# Patient Record
Sex: MALE | Race: WHITE | NOT HISPANIC OR LATINO | Employment: OTHER | URBAN - METROPOLITAN AREA
[De-identification: names, ages, dates, MRNs, and addresses within clinical notes are randomized per-mention and may not be internally consistent; named-entity substitution may affect disease eponyms.]

---

## 2018-07-01 ENCOUNTER — HOSPITAL ENCOUNTER (EMERGENCY)
Facility: HOSPITAL | Age: 38
Discharge: HOME | End: 2018-07-02
Attending: EMERGENCY MEDICINE
Payer: COMMERCIAL

## 2018-07-01 VITALS
HEIGHT: 73 IN | BODY MASS INDEX: 23.86 KG/M2 | OXYGEN SATURATION: 99 % | DIASTOLIC BLOOD PRESSURE: 81 MMHG | HEART RATE: 83 BPM | WEIGHT: 180 LBS | TEMPERATURE: 99 F | SYSTOLIC BLOOD PRESSURE: 130 MMHG

## 2018-07-01 DIAGNOSIS — S62.92XB OPEN FRACTURE OF LEFT HAND, INITIAL ENCOUNTER: Primary | ICD-10-CM

## 2018-07-01 PROBLEM — M79.5 FOREIGN BODY (FB) IN SOFT TISSUE: Status: ACTIVE | Noted: 2018-07-01

## 2018-07-01 PROCEDURE — 99284 EMERGENCY DEPT VISIT MOD MDM: CPT | Mod: ,,, | Performed by: EMERGENCY MEDICINE

## 2018-07-01 PROCEDURE — 96372 THER/PROPH/DIAG INJ SC/IM: CPT

## 2018-07-01 PROCEDURE — 99284 EMERGENCY DEPT VISIT MOD MDM: CPT | Mod: 25

## 2018-07-01 PROCEDURE — 25000003 PHARM REV CODE 250: Performed by: EMERGENCY MEDICINE

## 2018-07-01 PROCEDURE — 20520 RMVL FB MUSC/TDN SIMPLE: CPT

## 2018-07-01 RX ORDER — CEFAZOLIN SODIUM 1 G/3ML
1 INJECTION, POWDER, FOR SOLUTION INTRAMUSCULAR; INTRAVENOUS
Status: COMPLETED | OUTPATIENT
Start: 2018-07-01 | End: 2018-07-02

## 2018-07-01 RX ORDER — OXYCODONE AND ACETAMINOPHEN 10; 325 MG/1; MG/1
1 TABLET ORAL EVERY 4 HOURS PRN
COMMUNITY
End: 2018-07-09

## 2018-07-01 RX ORDER — SULFAMETHOXAZOLE AND TRIMETHOPRIM 800; 160 MG/1; MG/1
1 TABLET ORAL 2 TIMES DAILY
Qty: 20 TABLET | Refills: 0 | Status: SHIPPED | OUTPATIENT
Start: 2018-07-01 | End: 2018-07-11

## 2018-07-01 RX ORDER — LIDOCAINE HYDROCHLORIDE 10 MG/ML
20 INJECTION, SOLUTION EPIDURAL; INFILTRATION; INTRACAUDAL; PERINEURAL
Status: COMPLETED | OUTPATIENT
Start: 2018-07-01 | End: 2018-07-01

## 2018-07-01 RX ADMIN — LIDOCAINE HYDROCHLORIDE 200 MG: 10 INJECTION, SOLUTION EPIDURAL; INFILTRATION; INTRACAUDAL; PERINEURAL at 11:07

## 2018-07-02 ENCOUNTER — TELEPHONE (OUTPATIENT)
Dept: ORTHOPEDICS | Facility: CLINIC | Age: 38
End: 2018-07-02

## 2018-07-02 PROCEDURE — 90471 IMMUNIZATION ADMIN: CPT | Performed by: EMERGENCY MEDICINE

## 2018-07-02 PROCEDURE — 63600175 PHARM REV CODE 636 W HCPCS: Performed by: EMERGENCY MEDICINE

## 2018-07-02 PROCEDURE — 90715 TDAP VACCINE 7 YRS/> IM: CPT | Performed by: EMERGENCY MEDICINE

## 2018-07-02 RX ADMIN — CEFAZOLIN 1 G: 330 INJECTION, POWDER, FOR SOLUTION INTRAMUSCULAR; INTRAVENOUS at 12:07

## 2018-07-02 RX ADMIN — CLOSTRIDIUM TETANI TOXOID ANTIGEN (FORMALDEHYDE INACTIVATED), CORYNEBACTERIUM DIPHTHERIAE TOXOID ANTIGEN (FORMALDEHYDE INACTIVATED), BORDETELLA PERTUSSIS TOXOID ANTIGEN (GLUTARALDEHYDE INACTIVATED), BORDETELLA PERTUSSIS FILAMENTOUS HEMAGGLUTININ ANTIGEN (FORMALDEHYDE INACTIVATED), BORDETELLA PERTUSSIS PERTACTIN ANTIGEN, AND BORDETELLA PERTUSSIS FIMBRIAE 2/3 ANTIGEN 0.5 ML: 5; 2; 2.5; 5; 3; 5 INJECTION, SUSPENSION INTRAMUSCULAR at 12:07

## 2018-07-02 NOTE — TELEPHONE ENCOUNTER
Called pt left VM ,appt made to see Nurys on Monday 7/9/18 at 3pm for left hand fx and nail bed removal. ER f/u from yesterday 7/1/18. appt details left in VM.

## 2018-07-02 NOTE — ASSESSMENT & PLAN NOTE
Good Hill is a 37 y.o. male with a nail gun injury to his second and 3rd digit of his left hand sustaining a fracture through the distal aspect of the middle phalanx of second digit.    -digital nerve block was performed in ED after nail was cleaned with betadine  -after adequate analgesia the nail was removed with vice   -wounds were then cleaned with normal saline and betadine  -placed in an ulnar gutter splint  -will have him follow up in hand clinic and he will be given bactrim for antibiotics

## 2018-07-02 NOTE — ED NOTES
Patient identifiers for Good Hill 37 y.o. male checked and correct.  Chief Complaint   Patient presents with    Puncture Wound     pt states he was using nail gun and accidently impaled left pointer finger to index finger with 3 1/2 inch nails x 10 mintues ago. pt complains of pain 4/10 and numbness to both fingers.      History reviewed. No pertinent past medical history.  Allergies reported: Review of patient's allergies indicates:  No Known Allergies      LOC: Patient is awake, alert, and aware of environment with an appropriate affect. Patient is oriented x 3 and speaking appropriately.  APPEARANCE: Patient resting comfortably and in no acute distress. Patient is clean and well groomed, patient's clothing is properly fastened.  SKIN: The skin is warm and dry.   MUSKULOSKELETAL: Patient is moving all extremities well, limited ROM to index and middle finger, nail noted through left index finger into middle finger, no bleeding noted, numbness to those two finger at this time, +pulse.   RESPIRATORY: Airway is open and patent. Respirations are spontaneous and non-labored with normal effort and rate.

## 2018-07-02 NOTE — HPI
Good Hill is a 37 y.o. RHD male who presents to the ED after he accidentally shot a nail through his left hand at his second and third digit while he was doing some wood work.  He felt immediate pain and came straight to the ED.  Tetanus was unknown and was given in the ED.  Ancef was also given. He denies any other injuries and he denies numbness and tingling.

## 2018-07-02 NOTE — TELEPHONE ENCOUNTER
----- Message from Tab Mendoza MD sent at 7/1/2018 11:42 PM CDT -----  Please schedule follow up for this patient in one week with Nurys or Victorino after nail removal in ED with middle phalanx fx to 2nd digit.  Thanks!

## 2018-07-02 NOTE — ED TRIAGE NOTES
Nail through left index and middle finger while using a nail gun. No bleeding noted at this time.

## 2018-07-02 NOTE — SUBJECTIVE & OBJECTIVE
"History reviewed. No pertinent past medical history.    History reviewed. No pertinent surgical history.    Review of patient's allergies indicates:  No Known Allergies    Current Facility-Administered Medications   Medication    ceFAZolin injection 1 g    lidocaine (PF) 10 mg/ml (1%) injection 200 mg    Tdap vaccine injection 0.5 mL     Current Outpatient Prescriptions   Medication Sig    oxyCODONE-acetaminophen (PERCOCET)  mg per tablet Take 1 tablet by mouth every 4 (four) hours as needed for Pain.     Family History     None        Social History Main Topics    Smoking status: Current Some Day Smoker    Smokeless tobacco: Never Used    Alcohol use Yes    Drug use: Yes     Types: Marijuana    Sexual activity: Not on file     ROS     Constitutional: negative for fevers  Eyes: no visual changes  ENT: negative for hearing loss  Respiratory: negative for dyspnea  Cardiovascular: negative for chest pain  Gastrointestinal: negative for abdominal pain  Genitourinary: negative for dysuria  Neurological: negative for headaches  Behavioral/Psych: negative for hallucinations  Endocrine: negative for temperature intolerance    Objective:     Vital Signs (Most Recent):  Temp: 98.7 °F (37.1 °C) (07/01/18 2205)  Pulse: 83 (07/01/18 2205)  BP: 130/81 (07/01/18 2205)  SpO2: 99 % (07/01/18 2205) Vital Signs (24h Range):  Temp:  [98.7 °F (37.1 °C)] 98.7 °F (37.1 °C)  Pulse:  [83] 83  SpO2:  [99 %] 99 %  BP: (130)/(81) 130/81     Weight: 81.6 kg (180 lb)  Height: 6' 1" (185.4 cm)  Body mass index is 23.75 kg/m².    No intake or output data in the 24 hours ending 07/01/18 8684    Ortho/SPM Exam    Gen:  No acute distress  CV:  Peripherally well-perfused.  Pulses 2+ bilaterally.  Lungs:  Normal respiratory effort.  Abdomen:  Soft, non-tender, non-distended  Head/Neck:  Normocephalic.  Atraumatic. No TTP, AROM and PROM intact without pain  Neuro:  CN intact without deficit, SILT throughout B/L Upper & Lower " Extremities  Pelvis: No TTP    MSK:  LUE:  - there is 4cm nail through the medial aspect of the second digit at the level of the middle phalanx through the 3rd digit superficially  - AROM and PROM of shoulder elbow and wrist and MCP is intact  - AIN/PIN/Radial/Median/Ulnar Nerves assessed in isolation without deficit  - SILT throughout  - Radial & Ulnar arteries palpated 2+  - Capillary Refill <3s    Bilateral LE:  - no acute deformity   - AROM and PROM intact.  - TA/EHL/Gastroc/FHL assessed in isolation without deficit  - SILT throughout  - DP and PT palpated  2+  - Capillary Refill <3s        Significant Labs: All pertinent labs within the past 24 hours have been reviewed.    Significant Imaging: I have reviewed and interpreted all pertinent imaging results/findings.   Nail traversing the 2nd and 3rd digit possible fx through middle phalanx of 2nd digit

## 2018-07-02 NOTE — ED PROVIDER NOTES
Encounter Date: 7/1/2018    SCRIBE #1 NOTE: I, Heidi Agustin, am scribing for, and in the presence of, Hien Cabezas MD.       History     Chief Complaint   Patient presents with    Puncture Wound     pt states he was using nail gun and accidently impaled left pointer finger to index finger with 3 1/2 inch nails x 10 mintues ago. pt complains of pain 4/10 and numbness to both fingers.      Mr. Good Hill is a 37 y.o.  male with arthritis of right hand who presents to the ED for evaluation of puncture wounds to left middle and pointer fingers. The patient states that he was working on his house earlier tonight and impaled his fingers on a 3.5 in nail from a nail-gun. He denies any preceding symptoms, including lightheadedness, weakness, or confusion.The nail is in place. Associated symptoms include mechanical disruption of ROM of fingers. He denies any other symptoms at this time, including numbness. He reports managing his pain with half of a percocet at home. He is comfortable at this time. The patient is right-handed and works as a . Last tetanus vaccine was more than 7 years ago.            The history is provided by the patient and medical records.     Review of patient's allergies indicates:  No Known Allergies  History reviewed. No pertinent past medical history.  History reviewed. No pertinent surgical history.  History reviewed. No pertinent family history.  Social History   Substance Use Topics    Smoking status: Current Some Day Smoker    Smokeless tobacco: Never Used    Alcohol use Yes     Review of Systems   Constitutional: Negative for fever.   HENT: Negative for sore throat.    Respiratory: Negative for shortness of breath.    Cardiovascular: Negative for chest pain.   Gastrointestinal: Negative for nausea.   Genitourinary: Negative for dysuria.   Musculoskeletal: Positive for arthralgias and myalgias. Negative for back pain.   Skin: Positive for wound. Negative for rash.    Neurological: Negative for tremors, weakness and numbness.   Hematological: Does not bruise/bleed easily.       Physical Exam     Initial Vitals [07/01/18 2205]   BP Pulse Resp Temp SpO2   130/81 83 -- 98.7 °F (37.1 °C) 99 %      MAP       --         Physical Exam    Nursing note and vitals reviewed.  Constitutional: He appears well-developed and well-nourished. He is not diaphoretic. No distress.   HENT:   Head: Normocephalic and atraumatic.   Mouth/Throat: Oropharynx is clear and moist.   Eyes: Conjunctivae and EOM are normal. Pupils are equal, round, and reactive to light.   Neck: Normal range of motion. Neck supple. No JVD present.   Cardiovascular: Normal rate, regular rhythm and normal heart sounds.   No murmur heard.  Pulmonary/Chest: Breath sounds normal. He has no wheezes. He has no rhonchi. He has no rales. He exhibits no tenderness.   Abdominal: Soft. Bowel sounds are normal. He exhibits no distension and no mass. There is no tenderness. There is no rebound and no guarding.   Musculoskeletal: He exhibits no edema.   LUE: Sensation is intact. Capillary refill is WNL.    Neurological: He is alert and oriented to person, place, and time. He has normal strength. No cranial nerve deficit or sensory deficit.   Skin: Skin is warm and dry. No rash noted.   Psychiatric: He has a normal mood and affect.         ED Course   Procedures  Labs Reviewed - No data to display       Imaging Results    None          Medical Decision Making:   History:   Old Medical Records: I decided to obtain old medical records.  Clinical Tests:   Radiological Study: Ordered and Reviewed  Other:   I have discussed this case with another health care provider.       <> Summary of the Discussion:   2246:  Case discussed with Orthopedics. They are aware of patient's presentation and following imaging.             Scribe Attestation:   Scribe #1: I performed the above scribed service and the documentation accurately describes the services I  performed. I attest to the accuracy of the note.    Attending Attestation:             Attending ED Notes:   Patient is a right-handed  that used a nail gun and has a nail through his index and middle finger on the left.  Distal capillary refill is intact distal sensation is intact.  I spoke with Orthopedics.  They are requesting tetanus and g of Ancef.    Ortho removed nail; ok to dc w bactirm and hand fu; has pain meds at home             Clinical Impression: open fracture left index finger   The encounter diagnosis was Open fracture of left hand, initial encounter.                             Hien Cabezas MD  07/02/18 0051

## 2018-07-02 NOTE — CONSULTS
[facility]  Orthopedics  Consult Note    Patient Name: Good Hill  MRN: [id number]  Admission Date: 7/1/2018  Hospital Length of Stay: 0 days  Attending Provider: Nilam Coy MD  Primary Care Provider: Isauro Almanza MD    Patient information was obtained from patient and ER records.     Inpatient consult to Orthopedic Surgery  Consult performed by: SWATI ULRICH  Consult ordered by: NILAM COY        Subjective:     Principal Problem:Foreign body (FB) in soft tissue    Chief Complaint:   Chief Complaint   Patient presents with    Puncture Wound     pt states he was using nail gun and accidently impaled left pointer finger to index finger with 3 1/2 inch nails x 10 mintues ago. pt complains of pain 4/10 and numbness to both fingers.         HPI: Good Hill is a 37 y.o. RHD male who presents to the ED after he accidentally shot a nail through his left hand at his second and third digit while he was doing some wood work.  He felt immediate pain and came straight to the ED.  Tetanus was unknown and was given in the ED.  Ancef was also given. He denies any other injuries and he denies numbness and tingling.    History reviewed. No pertinent past medical history.    History reviewed. No pertinent surgical history.    Review of patient's allergies indicates:  No Known Allergies    Current Facility-Administered Medications   Medication    ceFAZolin injection 1 g    lidocaine (PF) 10 mg/ml (1%) injection 200 mg    Tdap vaccine injection 0.5 mL     Current Outpatient Prescriptions   Medication Sig    oxyCODONE-acetaminophen (PERCOCET)  mg per tablet Take 1 tablet by mouth every 4 (four) hours as needed for Pain.     Family History     None        Social History Main Topics    Smoking status: Current Some Day Smoker    Smokeless tobacco: Never Used    Alcohol use Yes    Drug use: Yes     Types: Marijuana    Sexual activity: Not on file     ROS     Constitutional: negative  "for fevers  Eyes: no visual changes  ENT: negative for hearing loss  Respiratory: negative for dyspnea  Cardiovascular: negative for chest pain  Gastrointestinal: negative for abdominal pain  Genitourinary: negative for dysuria  Neurological: negative for headaches  Behavioral/Psych: negative for hallucinations  Endocrine: negative for temperature intolerance    Objective:     Vital Signs (Most Recent):  Temp: 98.7 °F (37.1 °C) (07/01/18 2205)  Pulse: 83 (07/01/18 2205)  BP: 130/81 (07/01/18 2205)  SpO2: 99 % (07/01/18 2205) Vital Signs (24h Range):  Temp:  [98.7 °F (37.1 °C)] 98.7 °F (37.1 °C)  Pulse:  [83] 83  SpO2:  [99 %] 99 %  BP: (130)/(81) 130/81     Weight: 81.6 kg (180 lb)  Height: 6' 1" (185.4 cm)  Body mass index is 23.75 kg/m².    No intake or output data in the 24 hours ending 07/01/18 2334    Ortho/SPM Exam    Gen:  No acute distress  CV:  Peripherally well-perfused.  Pulses 2+ bilaterally.  Lungs:  Normal respiratory effort.  Abdomen:  Soft, non-tender, non-distended  Head/Neck:  Normocephalic.  Atraumatic. No TTP, AROM and PROM intact without pain  Neuro:  CN intact without deficit, SILT throughout B/L Upper & Lower Extremities  Pelvis: No TTP    MSK:  LUE:  - there is 4cm nail through the medial aspect of the second digit at the level of the middle phalanx through the 3rd digit superficially  - AROM and PROM of shoulder elbow and wrist and MCP is intact  - AIN/PIN/Radial/Median/Ulnar Nerves assessed in isolation without deficit  - SILT throughout  - Radial & Ulnar arteries palpated 2+  - Capillary Refill <3s    Bilateral LE:  - no acute deformity   - AROM and PROM intact.  - TA/EHL/Gastroc/FHL assessed in isolation without deficit  - SILT throughout  - DP and PT palpated  2+  - Capillary Refill <3s        Significant Labs: All pertinent labs within the past 24 hours have been reviewed.    Significant Imaging: I have reviewed and interpreted all pertinent imaging results/findings.   Nail traversing " the 2nd and 3rd digit   Post removal of nail shows fx through distal aspect of middle phalanx of 2nd digit    Assessment/Plan:     * Foreign body (FB) in soft tissue    Good Hill is a 37 y.o. male with a nail gun injury to his second and 3rd digit of his left hand sustaining a fracture through the distal aspect of the middle phalanx of second digit.    -digital nerve block was performed in ED after nail was cleaned with betadine  -after adequate analgesia the nail was removed with vice   -wounds were then cleaned with normal saline and betadine  -placed in an ulnar gutter splint  -will have him follow up in hand clinic and he will be given bactrim for antibiotics             Thank you for your consult. I will follow-up with patient. Please contact us if you have any additional questions.    Tab Mendoza MD  Orthopedics  [facility]

## 2018-07-09 ENCOUNTER — OFFICE VISIT (OUTPATIENT)
Dept: ORTHOPEDICS | Facility: CLINIC | Age: 38
End: 2018-07-09
Payer: COMMERCIAL

## 2018-07-09 VITALS
DIASTOLIC BLOOD PRESSURE: 71 MMHG | HEART RATE: 67 BPM | HEIGHT: 73 IN | BODY MASS INDEX: 23.86 KG/M2 | WEIGHT: 180 LBS | SYSTOLIC BLOOD PRESSURE: 152 MMHG

## 2018-07-09 DIAGNOSIS — S62.651B OPEN NONDISPLACED FRACTURE OF MIDDLE PHALANX OF LEFT INDEX FINGER, INITIAL ENCOUNTER: Primary | ICD-10-CM

## 2018-07-09 PROCEDURE — 76000 FLUOROSCOPY <1 HR PHYS/QHP: CPT | Mod: 26,S$GLB,, | Performed by: PHYSICIAN ASSISTANT

## 2018-07-09 PROCEDURE — 99204 OFFICE O/P NEW MOD 45 MIN: CPT | Mod: 25,S$GLB,, | Performed by: PHYSICIAN ASSISTANT

## 2018-07-09 PROCEDURE — 99999 PR PBB SHADOW E&M-EST. PATIENT-LVL III: CPT | Mod: PBBFAC,,, | Performed by: PHYSICIAN ASSISTANT

## 2018-07-09 PROCEDURE — 3008F BODY MASS INDEX DOCD: CPT | Mod: CPTII,S$GLB,, | Performed by: PHYSICIAN ASSISTANT

## 2018-07-09 NOTE — PROGRESS NOTES
"Subjective:      Patient ID: Good Hill is a 37 y.o. male.    Chief Complaint: Pain of the Left Hand      HPI  Good Hill is a 37 y.o. male presenting today for fracture of left index middle phalanx. Injury occurred 7/1/18. He was doing wood work and accidentally shot a nail through his left hand at his second and third digit. He presented to the ED. Nail was removed. Pt given Tetanus and Ancef in ED. Placed into a splint, he states this fell off,  He has been keeping it immobilized. Pain is tolerable. He is taking abx as prescribed.     Review of patient's allergies indicates:  No Known Allergies      Current Outpatient Prescriptions   Medication Sig Dispense Refill    sulfamethoxazole-trimethoprim 800-160mg (BACTRIM DS) 800-160 mg Tab Take 1 tablet by mouth 2 (two) times daily. for 10 days 20 tablet 0     No current facility-administered medications for this visit.        History reviewed. No pertinent past medical history.    History reviewed. No pertinent surgical history.    Review of Systems:  Constitutional: Negative for chills and fever.   Respiratory: Negative for cough and shortness of breath.    Gastrointestinal: Negative for nausea and vomiting.   Skin: Negative for rash.   Neurological: Negative for dizziness and headaches.   Psychiatric/Behavioral: Negative for depression.   MSK as in HPI       OBJECTIVE:     PHYSICAL EXAM:  BP (!) 152/71 (BP Location: Left arm, Patient Position: Sitting, BP Method: Small (Automatic))   Pulse 67   Ht 6' 1" (1.854 m)   Wt 81.6 kg (180 lb)   BMI 23.75 kg/m²     GEN:  NAD, well-developed, well-groomed.  NEURO: Awake, alert, and oriented. Normal attention and concentration.    PSYCH: Normal mood and affect. Behavior is normal.  HEENT: No cervical lymphadenopathy noted.  CARDIOVASCULAR: Radial pulses 2+ bilaterally. No LE edema noted.  PULMONARY: Breath sounds normal. No respiratory distress.  SKIN: Intact, no rashes.      MSK:   LUE:  Good active ROM of the wrist and " fingers. Decreased motion of the index and middle. there are healed puncture wounds in location of the nail entrance/exit. AIN/PIN/Radial/Median/Ulnar Nerves assessed in isolation without deficit. Radial & Ulnar arteries palpated 2+. Capillary Refill <3s.      RADIOGRAPHS:  7/1/18 xray left hand   FINDINGS:  There is a mildly comminuted fracture involving the distal aspect of the 2nd middle phalanx, best characterized on the oblique radiograph.  Possible extension to the articular surface as seen on the lateral radiograph.  Stable chronic appearing changes involving the scaphoid a and calcifications at the volar aspect of the wrist.  Mild soft tissue swelling in the region of the recently removed metallic nail.  No new or residual radiopaque foreign body.      Fracture examined under fluoro today and appears stable.  Comments: I have personally reviewed the imaging and I agree with the above radiologist's report.    ASSESSMENT/PLAN:       ICD-10-CM ICD-9-CM   1. Open nondisplaced fracture of middle phalanx of left index finger, initial encounter S62.651B 816.11       Orders Placed This Encounter    X-Ray Finger 2 View or More Views      Plan:   -kisha for left index, full time   -examined under fluoro today   -RTC 2 wks with xrays         The patient indicates understanding of these issues and agrees to the plan.    Nurys West PA-C  Hand Clinic   [facility]  [place], [place]

## 2018-07-23 ENCOUNTER — HOSPITAL ENCOUNTER (OUTPATIENT)
Dept: RADIOLOGY | Facility: OTHER | Age: 38
Discharge: HOME | End: 2018-07-23
Attending: PHYSICIAN ASSISTANT
Payer: COMMERCIAL

## 2018-07-23 ENCOUNTER — OFFICE VISIT (OUTPATIENT)
Dept: ORTHOPEDICS | Facility: CLINIC | Age: 38
End: 2018-07-23
Payer: COMMERCIAL

## 2018-07-23 VITALS
HEIGHT: 73 IN | WEIGHT: 180 LBS | SYSTOLIC BLOOD PRESSURE: 121 MMHG | HEART RATE: 61 BPM | DIASTOLIC BLOOD PRESSURE: 72 MMHG | BODY MASS INDEX: 23.86 KG/M2

## 2018-07-23 DIAGNOSIS — S62.651B OPEN NONDISPLACED FRACTURE OF MIDDLE PHALANX OF LEFT INDEX FINGER, INITIAL ENCOUNTER: Primary | ICD-10-CM

## 2018-07-23 DIAGNOSIS — S62.651B OPEN NONDISPLACED FRACTURE OF MIDDLE PHALANX OF LEFT INDEX FINGER, INITIAL ENCOUNTER: ICD-10-CM

## 2018-07-23 PROCEDURE — 3008F BODY MASS INDEX DOCD: CPT | Mod: CPTII,S$GLB,, | Performed by: PHYSICIAN ASSISTANT

## 2018-07-23 PROCEDURE — 99213 OFFICE O/P EST LOW 20 MIN: CPT | Mod: S$GLB,,, | Performed by: PHYSICIAN ASSISTANT

## 2018-07-23 PROCEDURE — 99999 PR PBB SHADOW E&M-EST. PATIENT-LVL III: CPT | Mod: PBBFAC,,, | Performed by: PHYSICIAN ASSISTANT

## 2018-07-23 PROCEDURE — 73140 X-RAY EXAM OF FINGER(S): CPT | Mod: TC,FY

## 2018-07-23 PROCEDURE — 73140 X-RAY EXAM OF FINGER(S): CPT | Mod: 26,LT,, | Performed by: RADIOLOGY

## 2018-07-23 NOTE — PROGRESS NOTES
"Subjective:      Patient ID: Good Hill is a 37 y.o. male.    Chief Complaint: Pain of the Left Hand      HPI  Good Hill is a 37 y.o. male presenting today for fracture of left index middle phalanx. Injury occurred 7/1/18. He was doing wood work and accidentally shot a nail through his left hand at his second and third digit. He presented to the ED. Nail was removed. Pt given Tetanus and Ancef in ED. Placed into a splint, he states this fell off,  He has been keeping it immobilized. Pain is tolerable. He is taking abx as prescribed.     Interval   Pt presents for follow up left index middle phalanx fracture sustained on 7/1/18. He has been keeping the finger splinted. He bought a new OTC splint.     Review of patient's allergies indicates:  No Known Allergies      No current outpatient prescriptions on file.     No current facility-administered medications for this visit.        History reviewed. No pertinent past medical history.    History reviewed. No pertinent surgical history.    Review of Systems:  Constitutional: Negative for chills and fever.   Respiratory: Negative for cough and shortness of breath.    Gastrointestinal: Negative for nausea and vomiting.   Skin: Negative for rash.   Neurological: Negative for dizziness and headaches.   Psychiatric/Behavioral: Negative for depression.   MSK as in HPI       OBJECTIVE:     PHYSICAL EXAM:  /72 (BP Location: Left arm, Patient Position: Sitting, BP Method: Small (Automatic))   Pulse 61   Ht 6' 1" (1.854 m)   Wt 81.6 kg (180 lb)   BMI 23.75 kg/m²     GEN:  NAD, well-developed, well-groomed.  NEURO: Awake, alert, and oriented. Normal attention and concentration.    PSYCH: Normal mood and affect. Behavior is normal.  HEENT: No cervical lymphadenopathy noted.  CARDIOVASCULAR: Radial pulses 2+ bilaterally. No LE edema noted.  PULMONARY: Breath sounds normal. No respiratory distress.  SKIN: Intact, no rashes.      MSK:   LUE:  Good active ROM of the wrist and " fingers. Moderate edema of index. Decreased motion of the index and middle. there are healed puncture wounds in location of the nail entrance/exit. AIN/PIN/Radial/Median/Ulnar Nerves assessed in isolation without deficit. Radial & Ulnar arteries palpated 2+. Capillary Refill <3s.      RADIOGRAPHS:  7/23/18 left index finger   Impression   There is some signs of healing about the 2nd middle phalanx distal metaphysis.   Comments: I have personally reviewed the imaging and I agree with the above radiologist's report.    ASSESSMENT/PLAN:       ICD-10-CM ICD-9-CM   1. Open nondisplaced fracture of middle phalanx of left index finger, initial encounter S62.651B 816.11       Orders Placed This Encounter    X-Ray Finger 2 View or More Views    Ambulatory Referral to Physical/Occupational Therapy      Plan:   -OT ordered  -nakita tape full time, can splint at night   -RTC 3 wks with xrays         The patient indicates understanding of these issues and agrees to the plan.    Nurys West PA-C  Hand Clinic   [facility] [place]

## 2020-05-16 ENCOUNTER — OFFICE VISIT (OUTPATIENT)
Dept: INTERNAL MEDICINE | Facility: CLINIC | Age: 40
End: 2020-05-16
Payer: COMMERCIAL

## 2020-05-16 ENCOUNTER — LAB VISIT (OUTPATIENT)
Dept: INTERNAL MEDICINE | Facility: CLINIC | Age: 40
End: 2020-05-16
Payer: COMMERCIAL

## 2020-05-16 DIAGNOSIS — R50.9 FUO (FEVER OF UNKNOWN ORIGIN): Primary | ICD-10-CM

## 2020-05-16 DIAGNOSIS — R50.9 FUO (FEVER OF UNKNOWN ORIGIN): ICD-10-CM

## 2020-05-16 PROCEDURE — 99202 OFFICE O/P NEW SF 15 MIN: CPT | Mod: 95,,, | Performed by: INTERNAL MEDICINE

## 2020-05-16 PROCEDURE — 99202 PR OFFICE/OUTPT VISIT, NEW, LEVL II, 15-29 MIN: ICD-10-PCS | Mod: 95,,, | Performed by: INTERNAL MEDICINE

## 2020-05-16 PROCEDURE — U0003 INFECTIOUS AGENT DETECTION BY NUCLEIC ACID (DNA OR RNA); SEVERE ACUTE RESPIRATORY SYNDROME CORONAVIRUS 2 (SARS-COV-2) (CORONAVIRUS DISEASE [COVID-19]), AMPLIFIED PROBE TECHNIQUE, MAKING USE OF HIGH THROUGHPUT TECHNOLOGIES AS DESCRIBED BY CMS-2020-01-R: HCPCS

## 2020-05-16 RX ORDER — AZITHROMYCIN 250 MG/1
TABLET, FILM COATED ORAL
Qty: 6 TABLET | Refills: 0 | Status: SHIPPED | OUTPATIENT
Start: 2020-05-16

## 2020-05-16 NOTE — PROGRESS NOTES
Subjective:       Patient ID: Good Hill is a 39 y.o. male.    Chief Complaint: No chief complaint on file.  Dictation #1  MRN:[id number]  CSN:[id number]  Dict   HPI  Review of Systems   Constitutional: Positive for appetite change, fatigue and fever. Negative for activity change and unexpected weight change.   HENT: Positive for sore throat. Negative for dental problem, hearing loss, mouth sores, postnasal drip and sinus pressure.    Eyes: Negative for discharge and visual disturbance.   Respiratory: Positive for cough. Negative for shortness of breath.    Cardiovascular: Negative for chest pain and palpitations.   Gastrointestinal: Negative for abdominal pain, blood in stool, constipation, diarrhea and nausea.   Genitourinary: Negative for dysuria, hematuria and testicular pain.   Musculoskeletal: Negative for arthralgias, back pain, joint swelling and neck pain.   Skin: Negative for rash.   Neurological: Positive for headaches. Negative for weakness.   Psychiatric/Behavioral: Negative for agitation and sleep disturbance.     The patient location is: home  The chief complaint leading to consultation is: fever and sore throat  Visit type: audiovisual  Total time spent with patient: 15 min  Each patient to whom he or she provides medical services by telemedicine is:  (1) informed of the relationship between the physician and patient and the respective role of any other health care provider with respect to management of the patient; and (2) notified that he or she may decline to receive medical services by telemedicine and may withdraw from such care at any time.    Notes:  39-year-old male patient calls with onset today of headache, fever, sore throat, decreased taste and smell, decreased appetite.  He has been working in his business as a  and has not really been practicing good behavior in terms of the COVID epidemic.  He has had close contact with his fellow employees [employer] and it sounds like maybe customers as  well.  In addition he has 5 kids and a wife at home and the kids are playing with neighborhood friends.  No on else in the house is sick.  Patient is otherwise healthy.    Physical exam:  Not done since this is a virtual visit.    Impression:  1.  Illness consistent with COVID  2.  Could be a bacterial sinus infection    Plan:  1.  COVID testing was arranged in the drive-through 2.  Z-René was ordered 3.  Was advised on additional over-the-counter procedures to deal with his symptoms 4.  He was reminded for both he and his children to properly behave with the COVID epidemic.    Objective:      Physical Exam    Assessment:       1. FUO (fever of unknown origin)        Plan:

## 2020-05-17 ENCOUNTER — PATIENT MESSAGE (OUTPATIENT)
Dept: INTERNAL MEDICINE | Facility: CLINIC | Age: 40
End: 2020-05-17

## 2020-05-17 LAB — SARS-COV-2 RNA RESP QL NAA+PROBE: NOT DETECTED

## 2020-05-21 ENCOUNTER — TELEPHONE (OUTPATIENT)
Dept: INTERNAL MEDICINE | Facility: CLINIC | Age: 40
End: 2020-05-21

## 2020-05-21 NOTE — TELEPHONE ENCOUNTER
----- Message from Gerald Haque MD sent at 5/17/2020  6:56 AM CDT -----  He has neg COVID antibody and can continue the medrol, and should get better with that.  But he should practice better physical distancing with both he and his family as we discussed

## 2020-12-18 ENCOUNTER — OFFICE VISIT (OUTPATIENT)
Dept: URGENT CARE | Facility: CLINIC | Age: 40
End: 2020-12-18
Payer: COMMERCIAL

## 2020-12-18 VITALS
RESPIRATION RATE: 19 BRPM | SYSTOLIC BLOOD PRESSURE: 127 MMHG | WEIGHT: 185 LBS | HEART RATE: 79 BPM | TEMPERATURE: 98 F | BODY MASS INDEX: 24.52 KG/M2 | OXYGEN SATURATION: 98 % | HEIGHT: 73 IN | DIASTOLIC BLOOD PRESSURE: 86 MMHG

## 2020-12-18 DIAGNOSIS — Z11.59 ENCOUNTER FOR SCREENING FOR OTHER VIRAL DISEASES: ICD-10-CM

## 2020-12-18 DIAGNOSIS — G44.52 NEW DAILY PERSISTENT HEADACHE: Primary | ICD-10-CM

## 2020-12-18 LAB
CTP QC/QA: YES
SARS-COV-2 RDRP RESP QL NAA+PROBE: NEGATIVE

## 2020-12-18 PROCEDURE — U0002 COVID-19 LAB TEST NON-CDC: HCPCS | Mod: QW,S$GLB,, | Performed by: STUDENT IN AN ORGANIZED HEALTH CARE EDUCATION/TRAINING PROGRAM

## 2020-12-18 PROCEDURE — 99213 OFFICE O/P EST LOW 20 MIN: CPT | Mod: S$GLB,CS,, | Performed by: STUDENT IN AN ORGANIZED HEALTH CARE EDUCATION/TRAINING PROGRAM

## 2020-12-18 PROCEDURE — 99213 PR OFFICE/OUTPT VISIT, EST, LEVL III, 20-29 MIN: ICD-10-PCS | Mod: S$GLB,CS,, | Performed by: STUDENT IN AN ORGANIZED HEALTH CARE EDUCATION/TRAINING PROGRAM

## 2020-12-18 PROCEDURE — U0002: ICD-10-PCS | Mod: QW,S$GLB,, | Performed by: STUDENT IN AN ORGANIZED HEALTH CARE EDUCATION/TRAINING PROGRAM

## 2020-12-18 PROCEDURE — 3008F PR BODY MASS INDEX (BMI) DOCUMENTED: ICD-10-PCS | Mod: CPTII,S$GLB,, | Performed by: STUDENT IN AN ORGANIZED HEALTH CARE EDUCATION/TRAINING PROGRAM

## 2020-12-18 PROCEDURE — 3008F BODY MASS INDEX DOCD: CPT | Mod: CPTII,S$GLB,, | Performed by: STUDENT IN AN ORGANIZED HEALTH CARE EDUCATION/TRAINING PROGRAM

## 2020-12-18 NOTE — PROGRESS NOTES
"Subjective:       Patient ID: [place] is a 40 y.o. male.    Vitals:  height is 6' 1" (1.854 m) and weight is 83.9 kg (185 lb). His oral temperature is 97.8 °F (36.6 °C). His blood pressure is 127/86 and his pulse is 79. His respiration is 19 and oxygen saturation is 98%.     Chief Complaint: COVID-19 Concerns    Pt presents for COVID testing. Reports HA and neck soreness x2d with episode of diarrhea this AM. Exposed to coworker on Monday but was >48h from the coworkers positive test (they are asymptomatic). Denied f/c, n/v, abd pain, or lower respiratory sx's.    Headache   This is a new problem. The current episode started yesterday. The problem occurs constantly. The problem has been unchanged. The pain is located in the bilateral region. The pain does not radiate. The pain quality is not similar to prior headaches. The quality of the pain is described as squeezing. The pain is at a severity of 6/10. The pain is moderate. Associated symptoms include neck pain. Pertinent negatives include no abdominal pain, blurred vision, coughing, dizziness, ear pain, eye pain, fever, nausea, numbness, sinus pressure, sore throat or vomiting. Nothing aggravates the symptoms. He has tried nothing for the symptoms. The treatment provided no relief. There is no history of migraine headaches or recent head traumas.       Constitution: Negative for chills, fatigue and fever.   HENT: Positive for congestion. Negative for ear pain, sinus pressure and sore throat.    Neck: Positive for neck pain. Negative for neck stiffness, painful lymph nodes, degenerative disc disease and bulging disc disease.   Cardiovascular: Negative for chest pain, leg swelling and sob on exertion.   Eyes: Negative for eye discharge, eye pain, double vision and blurred vision.   Respiratory: Negative for chest tightness, cough, sputum production, shortness of breath, stridor and wheezing.    Gastrointestinal: Positive for diarrhea. Negative for abdominal pain, " nausea, vomiting, bright red blood in stool and dark colored stools.   Musculoskeletal: Negative for joint pain, joint swelling, muscle cramps and muscle ache.   Skin: Negative for color change, pale, rash and lesion.   Allergic/Immunologic: Negative for seasonal allergies.   Neurological: Positive for headaches. Negative for dizziness, light-headedness, passing out, history of migraines and numbness.   Hematologic/Lymphatic: Negative for swollen lymph nodes, easy bruising/bleeding and history of blood clots. Does not bruise/bleed easily.   Psychiatric/Behavioral: Negative for confusion, nervous/anxious, sleep disturbance and depression. The patient is not nervous/anxious.        Objective:      Physical Exam   Constitutional: He does not appear ill. No distress.   HENT:   Head: Normocephalic and atraumatic.   Eyes: Conjunctivae are normal.   Cardiovascular: Normal rate, regular rhythm and normal heart sounds.   Pulmonary/Chest: Effort normal and breath sounds normal. No respiratory distress.   Abdominal: Soft. Bowel sounds are normal. He exhibits no distension. There is no abdominal tenderness.   Neurological: He is alert. Psychiatric: His behavior is normal.   Vitals reviewed.    Recent Lab Results       12/18/20  1637         Acceptable Yes     SARS-CoV-2 RNA, Amplification, Qual Negative             Assessment:       1. New daily persistent headache    2. Encounter for screening for other viral diseases        Plan:         New daily persistent headache   - consistent with tension HA, counseled on home tx    Encounter for screening for other viral diseases  -     POCT COVID-19 Rapid Screening  - discussed negative result with patient. Counseled Symptomatic patient with known low risk exposure to continue home quarantine/isolation per CDC guidelines in event of false negative rapid COVID test    Follow up if symptoms worsen or fail to improve.    Saman Lewis MD/MPH  [facility]  Medicine  [facility]

## 2021-04-16 ENCOUNTER — PATIENT MESSAGE (OUTPATIENT)
Dept: RESEARCH | Facility: HOSPITAL | Age: 41
End: 2021-04-16

## 2022-01-14 ENCOUNTER — LAB VISIT (OUTPATIENT)
Dept: PRIMARY CARE CLINIC | Facility: CLINIC | Age: 42
End: 2022-01-14
Payer: COMMERCIAL

## 2022-01-14 DIAGNOSIS — Z20.822 CONTACT WITH AND (SUSPECTED) EXPOSURE TO COVID-19: ICD-10-CM

## 2022-01-14 LAB
CTP QC/QA: YES
SARS-COV-2 AG RESP QL IA.RAPID: NEGATIVE

## 2022-01-14 PROCEDURE — 87811 SARS-COV-2 COVID19 W/OPTIC: CPT
